# Patient Record
Sex: FEMALE | Race: WHITE | NOT HISPANIC OR LATINO
[De-identification: names, ages, dates, MRNs, and addresses within clinical notes are randomized per-mention and may not be internally consistent; named-entity substitution may affect disease eponyms.]

---

## 2018-01-09 ENCOUNTER — TRANSCRIPTION ENCOUNTER (OUTPATIENT)
Age: 28
End: 2018-01-09

## 2018-10-09 ENCOUNTER — TRANSCRIPTION ENCOUNTER (OUTPATIENT)
Age: 28
End: 2018-10-09

## 2019-03-15 ENCOUNTER — TRANSCRIPTION ENCOUNTER (OUTPATIENT)
Age: 29
End: 2019-03-15

## 2019-09-17 ENCOUNTER — APPOINTMENT (OUTPATIENT)
Dept: INTERNAL MEDICINE | Facility: CLINIC | Age: 29
End: 2019-09-17
Payer: COMMERCIAL

## 2019-09-17 VITALS
OXYGEN SATURATION: 97 % | RESPIRATION RATE: 16 BRPM | HEIGHT: 69 IN | DIASTOLIC BLOOD PRESSURE: 62 MMHG | TEMPERATURE: 98.2 F | BODY MASS INDEX: 27.55 KG/M2 | WEIGHT: 186 LBS | SYSTOLIC BLOOD PRESSURE: 110 MMHG | HEART RATE: 63 BPM

## 2019-09-17 DIAGNOSIS — Z83.3 FAMILY HISTORY OF DIABETES MELLITUS: ICD-10-CM

## 2019-09-17 PROCEDURE — 36415 COLL VENOUS BLD VENIPUNCTURE: CPT

## 2019-09-17 PROCEDURE — 99385 PREV VISIT NEW AGE 18-39: CPT | Mod: 25

## 2019-09-17 NOTE — HISTORY OF PRESENT ILLNESS
[FreeTextEntry1] : new pt cpx [de-identified] : 30 yo female here to establish care, pt reports hx of add in college- back in school now, was on adderall in the past

## 2019-09-17 NOTE — HEALTH RISK ASSESSMENT
[Excellent] : ~his/her~  mood as  excellent [] : No [Yes] : Yes [1 or 2 (0 pts)] : 1 or 2 (0 points) [No falls in past year] : Patient reported no falls in the past year [Never (0 pts)] : Never (0 points) [0] : 1) Little interest or pleasure doing things: Not at all (0) [Audit-CScore] : 0 [[id number]] : 0 [Patient reported PAP Smear was normal] : Patient reported PAP Smear was normal [Fully functional (bathing, dressing, toileting, transferring, walking, feeding)] : Fully functional (bathing, dressing, toileting, transferring, walking, feeding) [Fully functional (using the telephone, shopping, preparing meals, housekeeping, doing laundry, using] : Fully functional and needs no help or supervision to perform IADLs (using the telephone, shopping, preparing meals, housekeeping, doing laundry, using transportation, managing medications and managing finances) [Reports changes in hearing] : Reports no changes in hearing [Reports changes in vision] : Reports no changes in vision [PapSmearDate] : 01/19

## 2019-09-20 ENCOUNTER — TRANSCRIPTION ENCOUNTER (OUTPATIENT)
Age: 29
End: 2019-09-20

## 2019-09-23 LAB
ALBUMIN SERPL ELPH-MCNC: 4.6 G/DL
ALP BLD-CCNC: 42 U/L
ALT SERPL-CCNC: 21 U/L
ANION GAP SERPL CALC-SCNC: 13 MMOL/L
APPEARANCE: CLEAR
AST SERPL-CCNC: 21 U/L
BACTERIA: NEGATIVE
BASOPHILS # BLD AUTO: 0.03 K/UL
BASOPHILS NFR BLD AUTO: 0.5 %
BILIRUB SERPL-MCNC: 0.5 MG/DL
BILIRUBIN URINE: NEGATIVE
BLOOD URINE: NEGATIVE
BUN SERPL-MCNC: 19 MG/DL
CALCIUM SERPL-MCNC: 9.4 MG/DL
CHLORIDE SERPL-SCNC: 106 MMOL/L
CHOLEST SERPL-MCNC: 134 MG/DL
CHOLEST/HDLC SERPL: 2 RATIO
CO2 SERPL-SCNC: 21 MMOL/L
COLOR: NORMAL
CREAT SERPL-MCNC: 0.66 MG/DL
EOSINOPHIL # BLD AUTO: 0.14 K/UL
EOSINOPHIL NFR BLD AUTO: 2.2 %
ESTIMATED AVERAGE GLUCOSE: 88 MG/DL
FOLATE SERPL-MCNC: >20 NG/ML
GLUCOSE QUALITATIVE U: NEGATIVE
GLUCOSE SERPL-MCNC: 81 MG/DL
HBA1C MFR BLD HPLC: 4.7 %
HCT VFR BLD CALC: 42.7 %
HDLC SERPL-MCNC: 68 MG/DL
HGB BLD-MCNC: 13.6 G/DL
HYALINE CASTS: 1 /LPF
IMM GRANULOCYTES NFR BLD AUTO: 0.3 %
KETONES URINE: NEGATIVE
LDLC SERPL CALC-MCNC: 57 MG/DL
LEUKOCYTE ESTERASE URINE: NEGATIVE
LYMPHOCYTES # BLD AUTO: 1.5 K/UL
LYMPHOCYTES NFR BLD AUTO: 23.8 %
MAN DIFF?: NORMAL
MCHC RBC-ENTMCNC: 31.4 PG
MCHC RBC-ENTMCNC: 31.9 GM/DL
MCV RBC AUTO: 98.6 FL
MICROSCOPIC-UA: NORMAL
MONOCYTES # BLD AUTO: 0.65 K/UL
MONOCYTES NFR BLD AUTO: 10.3 %
NEUTROPHILS # BLD AUTO: 3.96 K/UL
NEUTROPHILS NFR BLD AUTO: 62.9 %
NITRITE URINE: NEGATIVE
PH URINE: 7.5
PLATELET # BLD AUTO: 251 K/UL
POTASSIUM SERPL-SCNC: 4.6 MMOL/L
PROT SERPL-MCNC: 6.2 G/DL
PROTEIN URINE: NEGATIVE
RBC # BLD: 4.33 M/UL
RBC # FLD: 12.7 %
RED BLOOD CELLS URINE: 3 /HPF
SODIUM SERPL-SCNC: 140 MMOL/L
SPECIFIC GRAVITY URINE: 1.02
SQUAMOUS EPITHELIAL CELLS: 2 /HPF
TRIGL SERPL-MCNC: 46 MG/DL
TSH SERPL-ACNC: 0.64 UIU/ML
UROBILINOGEN URINE: NORMAL
VIT B12 SERPL-MCNC: 664 PG/ML
WBC # FLD AUTO: 6.3 K/UL
WHITE BLOOD CELLS URINE: 0 /HPF

## 2019-11-11 ENCOUNTER — TRANSCRIPTION ENCOUNTER (OUTPATIENT)
Age: 29
End: 2019-11-11

## 2019-12-11 ENCOUNTER — TRANSCRIPTION ENCOUNTER (OUTPATIENT)
Age: 29
End: 2019-12-11

## 2019-12-21 ENCOUNTER — TRANSCRIPTION ENCOUNTER (OUTPATIENT)
Age: 29
End: 2019-12-21

## 2020-01-26 ENCOUNTER — TRANSCRIPTION ENCOUNTER (OUTPATIENT)
Age: 30
End: 2020-01-26

## 2020-02-04 ENCOUNTER — APPOINTMENT (OUTPATIENT)
Dept: OBGYN | Facility: CLINIC | Age: 30
End: 2020-02-04
Payer: COMMERCIAL

## 2020-02-04 VITALS — DIASTOLIC BLOOD PRESSURE: 79 MMHG | SYSTOLIC BLOOD PRESSURE: 120 MMHG | BODY MASS INDEX: 26.58 KG/M2 | WEIGHT: 180 LBS

## 2020-02-04 PROCEDURE — 99385 PREV VISIT NEW AGE 18-39: CPT

## 2020-02-04 NOTE — PHYSICAL EXAM
[Alert] : alert [Awake] : awake [Soft] : soft [Oriented x3] : oriented to person, place, and time [Normal] : uterus [No Bleeding] : there was no active vaginal bleeding [IUD String] : had an IUD string protruding out [Uterine Adnexae] : were not tender and not enlarged [Acute Distress] : no acute distress [Mass] : no breast mass [Nipple Discharge] : no nipple discharge [Axillary LAD] : no axillary lymphadenopathy [Tender] : non tender

## 2020-02-04 NOTE — HISTORY OF PRESENT ILLNESS
[1 Year Ago] : 1 year ago [Good] : being in good health [Healthy Diet] : a healthy diet [Regular Exercise] : regular exercise [Last Pap ___] : Last cervical pap smear was [unfilled] [Reproductive Age] : is of reproductive age [Contraception] : uses contraception [IUD] : has an intrauterine device [Sexually Active] : is sexually active [Weight Concerns] : no concerns with her weight [Menstrual Problems] : reports normal menses [Pregnancy History] : denies prior pregnancies

## 2020-02-06 LAB — HPV HIGH+LOW RISK DNA PNL CVX: NOT DETECTED

## 2020-02-10 LAB — CYTOLOGY CVX/VAG DOC THIN PREP: NORMAL

## 2020-04-26 ENCOUNTER — MESSAGE (OUTPATIENT)
Age: 30
End: 2020-04-26

## 2020-05-02 LAB
SARS-COV-2 IGG SERPL IA-ACNC: 0.4 INDEX
SARS-COV-2 IGG SERPL QL IA: NEGATIVE

## 2020-08-27 ENCOUNTER — APPOINTMENT (OUTPATIENT)
Dept: DERMATOLOGY | Facility: CLINIC | Age: 30
End: 2020-08-27
Payer: COMMERCIAL

## 2020-08-27 VITALS — WEIGHT: 190 LBS | BODY MASS INDEX: 28.14 KG/M2 | HEIGHT: 69 IN

## 2020-08-27 DIAGNOSIS — L70.0 ACNE VULGARIS: ICD-10-CM

## 2020-08-27 PROCEDURE — 99203 OFFICE O/P NEW LOW 30 MIN: CPT

## 2020-09-09 ENCOUNTER — NON-APPOINTMENT (OUTPATIENT)
Age: 30
End: 2020-09-09

## 2020-09-15 ENCOUNTER — APPOINTMENT (OUTPATIENT)
Dept: INTERNAL MEDICINE | Facility: CLINIC | Age: 30
End: 2020-09-15
Payer: COMMERCIAL

## 2020-09-15 ENCOUNTER — NON-APPOINTMENT (OUTPATIENT)
Age: 30
End: 2020-09-15

## 2020-09-15 VITALS
SYSTOLIC BLOOD PRESSURE: 110 MMHG | HEART RATE: 74 BPM | OXYGEN SATURATION: 97 % | HEIGHT: 69 IN | DIASTOLIC BLOOD PRESSURE: 72 MMHG | BODY MASS INDEX: 28.14 KG/M2 | TEMPERATURE: 98.1 F | WEIGHT: 190 LBS

## 2020-09-15 PROCEDURE — 93000 ELECTROCARDIOGRAM COMPLETE: CPT

## 2020-09-15 PROCEDURE — 99213 OFFICE O/P EST LOW 20 MIN: CPT | Mod: 25

## 2020-09-15 NOTE — HISTORY OF PRESENT ILLNESS
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

## 2020-09-17 ENCOUNTER — TRANSCRIPTION ENCOUNTER (OUTPATIENT)
Age: 30
End: 2020-09-17

## 2020-10-29 ENCOUNTER — LABORATORY RESULT (OUTPATIENT)
Age: 30
End: 2020-10-29

## 2020-12-20 ENCOUNTER — TRANSCRIPTION ENCOUNTER (OUTPATIENT)
Age: 30
End: 2020-12-20

## 2021-01-11 ENCOUNTER — OUTPATIENT (OUTPATIENT)
Dept: OUTPATIENT SERVICES | Facility: HOSPITAL | Age: 31
LOS: 1 days | End: 2021-01-11
Payer: COMMERCIAL

## 2021-01-11 DIAGNOSIS — Z20.828 CONTACT WITH AND (SUSPECTED) EXPOSURE TO OTHER VIRAL COMMUNICABLE DISEASES: ICD-10-CM

## 2021-01-11 PROCEDURE — U0003: CPT

## 2021-01-11 PROCEDURE — U0005: CPT

## 2021-01-11 PROCEDURE — C9803: CPT

## 2021-01-12 DIAGNOSIS — Z20.828 CONTACT WITH AND (SUSPECTED) EXPOSURE TO OTHER VIRAL COMMUNICABLE DISEASES: ICD-10-CM

## 2021-01-12 LAB — SARS-COV-2 RNA SPEC QL NAA+PROBE: SIGNIFICANT CHANGE UP

## 2021-02-07 ENCOUNTER — TRANSCRIPTION ENCOUNTER (OUTPATIENT)
Age: 31
End: 2021-02-07

## 2021-02-20 ENCOUNTER — APPOINTMENT (OUTPATIENT)
Dept: INTERNAL MEDICINE | Facility: CLINIC | Age: 31
End: 2021-02-20
Payer: COMMERCIAL

## 2021-02-20 ENCOUNTER — OUTPATIENT (OUTPATIENT)
Dept: OUTPATIENT SERVICES | Facility: HOSPITAL | Age: 31
LOS: 1 days | End: 2021-02-20
Payer: COMMERCIAL

## 2021-02-20 ENCOUNTER — LABORATORY RESULT (OUTPATIENT)
Age: 31
End: 2021-02-20

## 2021-02-20 ENCOUNTER — APPOINTMENT (OUTPATIENT)
Dept: ULTRASOUND IMAGING | Facility: CLINIC | Age: 31
End: 2021-02-20
Payer: COMMERCIAL

## 2021-02-20 VITALS
HEART RATE: 83 BPM | BODY MASS INDEX: 29.23 KG/M2 | SYSTOLIC BLOOD PRESSURE: 122 MMHG | HEIGHT: 69 IN | DIASTOLIC BLOOD PRESSURE: 78 MMHG | WEIGHT: 197.38 LBS | OXYGEN SATURATION: 98 % | TEMPERATURE: 98 F

## 2021-02-20 DIAGNOSIS — R13.0 APHAGIA: ICD-10-CM

## 2021-02-20 DIAGNOSIS — R14.0 ABDOMINAL DISTENSION (GASEOUS): ICD-10-CM

## 2021-02-20 PROCEDURE — 76700 US EXAM ABDOM COMPLETE: CPT | Mod: 26

## 2021-02-20 PROCEDURE — 36415 COLL VENOUS BLD VENIPUNCTURE: CPT

## 2021-02-20 PROCEDURE — 76700 US EXAM ABDOM COMPLETE: CPT

## 2021-02-20 PROCEDURE — 99213 OFFICE O/P EST LOW 20 MIN: CPT | Mod: 25

## 2021-02-20 PROCEDURE — 99072 ADDL SUPL MATRL&STAF TM PHE: CPT

## 2021-02-20 NOTE — PHYSICAL EXAM
[Non-distended] : non-distended [No Masses] : no abdominal mass palpated [No Hernias] : no hernias [Normal] : soft, non-tender, non-distended, no masses palpated, no HSM and normal bowel sounds [de-identified] : nonspecific mild upper abd pain

## 2021-02-20 NOTE — HISTORY OF PRESENT ILLNESS
[FreeTextEntry1] : GI issues [de-identified] : gets periodic regurgitation--worse past 2-3 months--can occur hours after eating--?odonophagia?\par no  anorexia, wt loss\par taking adderal with good benefit\par ?postprandial mild abd pain/bloating

## 2021-02-22 ENCOUNTER — TRANSCRIPTION ENCOUNTER (OUTPATIENT)
Age: 31
End: 2021-02-22

## 2021-02-22 LAB
ALBUMIN SERPL ELPH-MCNC: 4.6 G/DL
ALP BLD-CCNC: 61 U/L
ALT SERPL-CCNC: 20 U/L
ANION GAP SERPL CALC-SCNC: 12 MMOL/L
AST SERPL-CCNC: 22 U/L
BASOPHILS # BLD AUTO: 0.04 K/UL
BASOPHILS NFR BLD AUTO: 0.8 %
BILIRUB SERPL-MCNC: 0.4 MG/DL
BUN SERPL-MCNC: 10 MG/DL
CALCIUM SERPL-MCNC: 9.2 MG/DL
CHLORIDE SERPL-SCNC: 104 MMOL/L
CO2 SERPL-SCNC: 24 MMOL/L
CREAT SERPL-MCNC: 0.77 MG/DL
EOSINOPHIL # BLD AUTO: 0.14 K/UL
EOSINOPHIL NFR BLD AUTO: 2.9 %
GLUCOSE SERPL-MCNC: 78 MG/DL
HCT VFR BLD CALC: 41.6 %
HGB BLD-MCNC: 13.7 G/DL
IMM GRANULOCYTES NFR BLD AUTO: 0.2 %
LYMPHOCYTES # BLD AUTO: 1.71 K/UL
LYMPHOCYTES NFR BLD AUTO: 35.8 %
MAN DIFF?: NORMAL
MCHC RBC-ENTMCNC: 31.6 PG
MCHC RBC-ENTMCNC: 32.9 GM/DL
MCV RBC AUTO: 95.9 FL
MONOCYTES # BLD AUTO: 0.48 K/UL
MONOCYTES NFR BLD AUTO: 10 %
NEUTROPHILS # BLD AUTO: 2.4 K/UL
NEUTROPHILS NFR BLD AUTO: 50.3 %
PLATELET # BLD AUTO: 276 K/UL
POTASSIUM SERPL-SCNC: 4.6 MMOL/L
PROT SERPL-MCNC: 7 G/DL
RBC # BLD: 4.34 M/UL
RBC # FLD: 12.1 %
SODIUM SERPL-SCNC: 140 MMOL/L
WBC # FLD AUTO: 4.78 K/UL

## 2021-02-26 LAB
CELIAC DISEASE INTERPRETATION: NORMAL
CELIAC GENE PAIRS PRESENT: NO
DQ ALPHA 1: NORMAL
DQ BETA 1: NORMAL
IMMUNOGLOBULIN A (IGA): 173 MG/DL

## 2021-03-30 ENCOUNTER — APPOINTMENT (OUTPATIENT)
Dept: INTERNAL MEDICINE | Facility: CLINIC | Age: 31
End: 2021-03-30
Payer: COMMERCIAL

## 2021-03-30 ENCOUNTER — LABORATORY RESULT (OUTPATIENT)
Age: 31
End: 2021-03-30

## 2021-03-30 VITALS
HEART RATE: 77 BPM | BODY MASS INDEX: 28.58 KG/M2 | WEIGHT: 193 LBS | HEIGHT: 69 IN | DIASTOLIC BLOOD PRESSURE: 77 MMHG | SYSTOLIC BLOOD PRESSURE: 114 MMHG | OXYGEN SATURATION: 98 % | TEMPERATURE: 98.1 F

## 2021-03-30 DIAGNOSIS — Z83.49 FAMILY HISTORY OF OTHER ENDOCRINE, NUTRITIONAL AND METABOLIC DISEASES: ICD-10-CM

## 2021-03-30 PROCEDURE — 36415 COLL VENOUS BLD VENIPUNCTURE: CPT

## 2021-03-30 PROCEDURE — 99395 PREV VISIT EST AGE 18-39: CPT | Mod: 25

## 2021-03-30 PROCEDURE — 99072 ADDL SUPL MATRL&STAF TM PHE: CPT

## 2021-03-30 NOTE — HISTORY OF PRESENT ILLNESS
[FreeTextEntry1] : cpx [de-identified] : on ppi--still with some mild GERD without other GI symps\par using adderal during the week mostly--requests dose reduction

## 2021-04-02 LAB — TSI ACT/NOR SER: <0.1 IU/L

## 2021-04-03 LAB
CHOLEST SERPL-MCNC: 144 MG/DL
HDLC SERPL-MCNC: 67 MG/DL
LDLC SERPL CALC-MCNC: 64 MG/DL
NONHDLC SERPL-MCNC: 77 MG/DL
TRIGL SERPL-MCNC: 68 MG/DL

## 2021-04-15 ENCOUNTER — TRANSCRIPTION ENCOUNTER (OUTPATIENT)
Age: 31
End: 2021-04-15

## 2021-05-19 ENCOUNTER — APPOINTMENT (OUTPATIENT)
Dept: OBGYN | Facility: CLINIC | Age: 31
End: 2021-05-19
Payer: COMMERCIAL

## 2021-05-19 ENCOUNTER — NON-APPOINTMENT (OUTPATIENT)
Age: 31
End: 2021-05-19

## 2021-05-19 VITALS
SYSTOLIC BLOOD PRESSURE: 134 MMHG | DIASTOLIC BLOOD PRESSURE: 76 MMHG | HEIGHT: 69 IN | BODY MASS INDEX: 29.03 KG/M2 | WEIGHT: 196 LBS

## 2021-05-19 DIAGNOSIS — Z01.419 ENCOUNTER FOR GYNECOLOGICAL EXAMINATION (GENERAL) (ROUTINE) W/OUT ABNORMAL FINDINGS: ICD-10-CM

## 2021-05-19 DIAGNOSIS — Z30.431 ENCOUNTER FOR ROUTINE CHECKING OF INTRAUTERINE CONTRACEPTIVE DEVICE: ICD-10-CM

## 2021-05-19 DIAGNOSIS — N92.6 IRREGULAR MENSTRUATION, UNSPECIFIED: ICD-10-CM

## 2021-05-19 PROCEDURE — 99395 PREV VISIT EST AGE 18-39: CPT

## 2021-05-19 PROCEDURE — 99072 ADDL SUPL MATRL&STAF TM PHE: CPT

## 2021-05-19 PROCEDURE — 99213 OFFICE O/P EST LOW 20 MIN: CPT | Mod: 25

## 2021-05-19 NOTE — HISTORY OF PRESENT ILLNESS
[FreeTextEntry1] : 31 year-old patient with the IUD complaining of irregular staining which lasted 3-4 days this month,\par patient usually gets staining for a day . [Patient reported PAP Smear was normal] : Patient reported PAP Smear was normal [PapSmeardate] : 2020 [Abnormal Quantity] : abnormal quantity [Abnormal Duration] : abnormal duration [Light Bleeding] : light bleeding

## 2021-05-19 NOTE — DISCUSSION/SUMMARY
[FreeTextEntry1] : UCG negative,\par Patient thinks her irregular bleeding could be related to  vaccine,\par If bleeding continues recommend  transvaginal sonogram,\par all questions were answered.

## 2021-05-24 LAB — CYTOLOGY CVX/VAG DOC THIN PREP: NORMAL

## 2021-06-10 ENCOUNTER — TRANSCRIPTION ENCOUNTER (OUTPATIENT)
Age: 31
End: 2021-06-10

## 2021-06-10 ENCOUNTER — APPOINTMENT (OUTPATIENT)
Dept: CT IMAGING | Facility: CLINIC | Age: 31
End: 2021-06-10
Payer: COMMERCIAL

## 2021-06-10 ENCOUNTER — OUTPATIENT (OUTPATIENT)
Dept: OUTPATIENT SERVICES | Facility: HOSPITAL | Age: 31
LOS: 1 days | End: 2021-06-10
Payer: COMMERCIAL

## 2021-06-10 DIAGNOSIS — R10.9 UNSPECIFIED ABDOMINAL PAIN: ICD-10-CM

## 2021-06-10 PROCEDURE — 74176 CT ABD & PELVIS W/O CONTRAST: CPT | Mod: 26

## 2021-06-10 PROCEDURE — 74176 CT ABD & PELVIS W/O CONTRAST: CPT

## 2021-06-16 ENCOUNTER — TRANSCRIPTION ENCOUNTER (OUTPATIENT)
Age: 31
End: 2021-06-16

## 2021-08-24 ENCOUNTER — APPOINTMENT (OUTPATIENT)
Dept: OBGYN | Facility: CLINIC | Age: 31
End: 2021-08-24
Payer: COMMERCIAL

## 2021-08-24 VITALS — DIASTOLIC BLOOD PRESSURE: 81 MMHG | SYSTOLIC BLOOD PRESSURE: 136 MMHG | HEIGHT: 69 IN

## 2021-08-24 DIAGNOSIS — N90.89 OTHER SPECIFIED NONINFLAMMATORY DISORDERS OF VULVA AND PERINEUM: ICD-10-CM

## 2021-08-24 PROCEDURE — 99212 OFFICE O/P EST SF 10 MIN: CPT

## 2021-08-24 NOTE — PHYSICAL EXAM
[Appropriately responsive] : appropriately responsive [Alert] : alert [No Acute Distress] : no acute distress [Soft] : soft [Non-tender] : non-tender [Non-distended] : non-distended [Labia Majora] : normal [Labia Minora] : normal [Normal] : normal [IUD String] : an IUD string was noted

## 2021-08-24 NOTE — REVIEW OF SYSTEMS
[Fever] : no fever [Chills] : no chills [Fatigue] : no fatigue [Urgency] : no urgency [Frequency] : no frequency [Dysuria] : no dysuria

## 2021-08-24 NOTE — PLAN
[FreeTextEntry1] : -vulvar itching-  exam wnl, Affirm sent will follow up results\par - episode of cramping- UPT negative, patient counseled to journal the pain and bleeding, consider TVUS to check IUD placement if continues\par Patient to follow up in one year for annual exam

## 2021-08-24 NOTE — HISTORY OF PRESENT ILLNESS
[FreeTextEntry1] : 32yo P0 with Mirena IUD presents for vulvar itching\par patient states that the itching occurs at random. She also is concerned  about an episode of sharp cramping that was followed by small blood clots that occurred 1-2 weeks ago. SHe is otherwise amenorrheic secondary to IUD. patient also requests a UPT. \par Denies any recent new sexual encounter, abnormal vaginal discharge and dysuria

## 2021-08-26 LAB
CANDIDA VAG CYTO: NOT DETECTED
G VAGINALIS+PREV SP MTYP VAG QL MICRO: NOT DETECTED
T VAGINALIS VAG QL WET PREP: NOT DETECTED

## 2021-09-22 ENCOUNTER — APPOINTMENT (OUTPATIENT)
Dept: INTERNAL MEDICINE | Facility: CLINIC | Age: 31
End: 2021-09-22
Payer: COMMERCIAL

## 2021-09-22 VITALS
DIASTOLIC BLOOD PRESSURE: 79 MMHG | HEART RATE: 88 BPM | WEIGHT: 194 LBS | HEIGHT: 69 IN | OXYGEN SATURATION: 100 % | BODY MASS INDEX: 28.73 KG/M2 | TEMPERATURE: 98 F | SYSTOLIC BLOOD PRESSURE: 119 MMHG

## 2021-09-22 DIAGNOSIS — Z20.822 CONTACT WITH AND (SUSPECTED) EXPOSURE TO COVID-19: ICD-10-CM

## 2021-09-22 PROCEDURE — 36415 COLL VENOUS BLD VENIPUNCTURE: CPT

## 2021-09-22 PROCEDURE — 99213 OFFICE O/P EST LOW 20 MIN: CPT | Mod: 25

## 2021-09-22 NOTE — HISTORY OF PRESENT ILLNESS
[FreeTextEntry1] : f/u ADD [de-identified] : uses adderal at 10mg with good effect while working\par occ ppi use\par pt with hx of poor antibody-response to immunizations--covid vaccine 1/21-req covid ab level

## 2021-09-23 LAB
COVID-19 SPIKE DOMAIN ANTIBODY INTERPRETATION: POSITIVE
SARS-COV-2 AB SERPL IA-ACNC: >250 U/ML
TSH SERPL-ACNC: 0.94 UIU/ML

## 2021-12-29 ENCOUNTER — APPOINTMENT (OUTPATIENT)
Dept: OBGYN | Facility: CLINIC | Age: 31
End: 2021-12-29
Payer: COMMERCIAL

## 2021-12-29 DIAGNOSIS — Z53.8 PROCEDURE AND TREATMENT NOT CARRIED OUT FOR OTHER REASONS: ICD-10-CM

## 2021-12-29 DIAGNOSIS — Z30.431 ENCOUNTER FOR ROUTINE CHECKING OF INTRAUTERINE CONTRACEPTIVE DEVICE: ICD-10-CM

## 2021-12-29 DIAGNOSIS — Z97.5 PROCEDURE AND TREATMENT NOT CARRIED OUT FOR OTHER REASONS: ICD-10-CM

## 2021-12-29 PROCEDURE — 99213 OFFICE O/P EST LOW 20 MIN: CPT | Mod: 25

## 2021-12-29 PROCEDURE — 58301 REMOVE INTRAUTERINE DEVICE: CPT | Mod: 53

## 2021-12-29 NOTE — PLAN
[FreeTextEntry1] : Patient was counseled and will attempt to remove the IUD under sonogram guidance . patient will take misoprostol night before the procedure .

## 2021-12-29 NOTE — PROCEDURE
[Speculum Placed] : speculum placed [FreeTextEntry6] : Patient complains of discomfort and pain hence the procedure was terminated will do the procedure under sonogram guidance

## 2021-12-29 NOTE — HISTORY OF PRESENT ILLNESS
[FreeTextEntry1] : 31 year-old patient wanting her IUD removed.  Patient has an IUD for 3 years.\par Patient will use condom for contraception.

## 2021-12-30 ENCOUNTER — TRANSCRIPTION ENCOUNTER (OUTPATIENT)
Age: 31
End: 2021-12-30

## 2022-07-09 ENCOUNTER — NON-APPOINTMENT (OUTPATIENT)
Age: 32
End: 2022-07-09

## 2022-07-10 ENCOUNTER — NON-APPOINTMENT (OUTPATIENT)
Age: 32
End: 2022-07-10

## 2022-07-21 ENCOUNTER — APPOINTMENT (OUTPATIENT)
Dept: INTERNAL MEDICINE | Facility: CLINIC | Age: 32
End: 2022-07-21

## 2022-08-02 ENCOUNTER — APPOINTMENT (OUTPATIENT)
Dept: ORTHOPEDIC SURGERY | Facility: CLINIC | Age: 32
End: 2022-08-02

## 2022-08-02 VITALS — BODY MASS INDEX: 28.73 KG/M2 | WEIGHT: 194 LBS | HEIGHT: 69 IN

## 2022-08-02 DIAGNOSIS — M77.51 OTHER ENTHESOPATHY OF RT FOOT AND ANKLE: ICD-10-CM

## 2022-08-02 PROCEDURE — 73630 X-RAY EXAM OF FOOT: CPT | Mod: RT

## 2022-08-02 PROCEDURE — L4350: CPT

## 2022-08-02 PROCEDURE — 99203 OFFICE O/P NEW LOW 30 MIN: CPT

## 2022-08-02 PROCEDURE — 73600 X-RAY EXAM OF ANKLE: CPT | Mod: RT

## 2022-08-02 NOTE — PHYSICAL EXAM
[Right] : right foot and ankle [NL (40)] : plantar flexion 40 degrees [NL 30)] : inversion 30 degrees [NL (20)] : eversion 20 degrees [5___] : eversion 5[unfilled]/5 [2+] : dorsalis pedis pulse: 2+ [] : patient ambulates without assistive device

## 2022-08-02 NOTE — HISTORY OF PRESENT ILLNESS
[4] : 4 [0] : 0 [Dull/Aching] : dull/aching [Sharp] : sharp [Constant] : constant [de-identified] : 08/02/2022: Pt states she hit her foot on a table a month ago and has been having pain ever since. No tx to date. no prior foot probs. 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

## 2022-08-02 NOTE — ASSESSMENT
[FreeTextEntry1] : wbat\par airsport\par ice/elevate\par nsaids prn\par f/up prn\par activity as jeanie

## 2022-09-26 ENCOUNTER — APPOINTMENT (OUTPATIENT)
Dept: DERMATOLOGY | Facility: CLINIC | Age: 32
End: 2022-09-26

## 2022-09-26 DIAGNOSIS — L82.1 OTHER SEBORRHEIC KERATOSIS: ICD-10-CM

## 2022-09-26 DIAGNOSIS — D22.9 MELANOCYTIC NEVI, UNSPECIFIED: ICD-10-CM

## 2022-09-26 DIAGNOSIS — L81.4 OTHER MELANIN HYPERPIGMENTATION: ICD-10-CM

## 2022-09-26 PROCEDURE — 99213 OFFICE O/P EST LOW 20 MIN: CPT

## 2022-09-27 ENCOUNTER — APPOINTMENT (OUTPATIENT)
Dept: DERMATOLOGY | Facility: CLINIC | Age: 32
End: 2022-09-27

## 2022-10-06 ENCOUNTER — RESULT REVIEW (OUTPATIENT)
Age: 32
End: 2022-10-06

## 2022-10-26 ENCOUNTER — APPOINTMENT (OUTPATIENT)
Dept: INTERNAL MEDICINE | Facility: CLINIC | Age: 32
End: 2022-10-26

## 2022-10-26 VITALS
HEART RATE: 63 BPM | BODY MASS INDEX: 29.19 KG/M2 | TEMPERATURE: 98.3 F | SYSTOLIC BLOOD PRESSURE: 122 MMHG | OXYGEN SATURATION: 98 % | HEIGHT: 69 IN | WEIGHT: 197.06 LBS | DIASTOLIC BLOOD PRESSURE: 78 MMHG

## 2022-10-26 DIAGNOSIS — Z00.00 ENCOUNTER FOR GENERAL ADULT MEDICAL EXAMINATION W/OUT ABNORMAL FINDINGS: ICD-10-CM

## 2022-10-26 PROCEDURE — 36415 COLL VENOUS BLD VENIPUNCTURE: CPT

## 2022-10-26 PROCEDURE — 99395 PREV VISIT EST AGE 18-39: CPT | Mod: 25

## 2022-10-27 LAB
ALBUMIN SERPL ELPH-MCNC: 4.7 G/DL
ALP BLD-CCNC: 50 U/L
ALT SERPL-CCNC: 14 U/L
ANION GAP SERPL CALC-SCNC: 10 MMOL/L
APPEARANCE: CLEAR
AST SERPL-CCNC: 23 U/L
BACTERIA: ABNORMAL
BASOPHILS # BLD AUTO: 0.03 K/UL
BASOPHILS NFR BLD AUTO: 0.6 %
BILIRUB SERPL-MCNC: 0.5 MG/DL
BILIRUBIN URINE: NEGATIVE
BLOOD URINE: NEGATIVE
BUN SERPL-MCNC: 16 MG/DL
CALCIUM SERPL-MCNC: 9.5 MG/DL
CHLORIDE SERPL-SCNC: 106 MMOL/L
CHOLEST SERPL-MCNC: 149 MG/DL
CO2 SERPL-SCNC: 25 MMOL/L
COLOR: NORMAL
CREAT SERPL-MCNC: 0.83 MG/DL
EGFR: 97 ML/MIN/1.73M2
EOSINOPHIL # BLD AUTO: 0.1 K/UL
EOSINOPHIL NFR BLD AUTO: 2 %
GLUCOSE QUALITATIVE U: NEGATIVE
GLUCOSE SERPL-MCNC: 82 MG/DL
HCT VFR BLD CALC: 40 %
HDLC SERPL-MCNC: 72 MG/DL
HGB BLD-MCNC: 13.3 G/DL
HYALINE CASTS: 3 /LPF
IMM GRANULOCYTES NFR BLD AUTO: 0.2 %
KETONES URINE: NEGATIVE
LDLC SERPL CALC-MCNC: 69 MG/DL
LEUKOCYTE ESTERASE URINE: NEGATIVE
LYMPHOCYTES # BLD AUTO: 1.59 K/UL
LYMPHOCYTES NFR BLD AUTO: 32 %
MAN DIFF?: NORMAL
MCHC RBC-ENTMCNC: 30 PG
MCHC RBC-ENTMCNC: 33.3 GM/DL
MCV RBC AUTO: 90.3 FL
MICROSCOPIC-UA: NORMAL
MONOCYTES # BLD AUTO: 0.49 K/UL
MONOCYTES NFR BLD AUTO: 9.9 %
NEUTROPHILS # BLD AUTO: 2.75 K/UL
NEUTROPHILS NFR BLD AUTO: 55.3 %
NITRITE URINE: NEGATIVE
NONHDLC SERPL-MCNC: 77 MG/DL
PH URINE: 6.5
PLATELET # BLD AUTO: 277 K/UL
POTASSIUM SERPL-SCNC: 4.5 MMOL/L
PROT SERPL-MCNC: 7.5 G/DL
PROTEIN URINE: NEGATIVE
RBC # BLD: 4.43 M/UL
RBC # FLD: 13.9 %
RED BLOOD CELLS URINE: 4 /HPF
SODIUM SERPL-SCNC: 140 MMOL/L
SPECIFIC GRAVITY URINE: 1.01
SQUAMOUS EPITHELIAL CELLS: 4 /HPF
TRIGL SERPL-MCNC: 41 MG/DL
TSH SERPL-ACNC: 1.62 UIU/ML
UROBILINOGEN URINE: NORMAL
WBC # FLD AUTO: 4.97 K/UL
WHITE BLOOD CELLS URINE: 2 /HPF

## 2022-10-27 NOTE — HISTORY OF PRESENT ILLNESS
[FreeTextEntry1] : cpx [de-identified] : some GERD and feelings of reguritation not relieved by ppi--never saw GI\par covid vacced\par gym 6x/wk

## 2022-11-10 ENCOUNTER — APPOINTMENT (OUTPATIENT)
Dept: DERMATOLOGY | Facility: CLINIC | Age: 32
End: 2022-11-10

## 2022-12-21 ENCOUNTER — APPOINTMENT (OUTPATIENT)
Dept: GASTROENTEROLOGY | Facility: CLINIC | Age: 32
End: 2022-12-21

## 2022-12-21 VITALS
HEART RATE: 87 BPM | SYSTOLIC BLOOD PRESSURE: 140 MMHG | BODY MASS INDEX: 29.33 KG/M2 | OXYGEN SATURATION: 100 % | WEIGHT: 198 LBS | DIASTOLIC BLOOD PRESSURE: 81 MMHG | HEIGHT: 69 IN

## 2022-12-21 DIAGNOSIS — K21.9 GASTRO-ESOPHAGEAL REFLUX DISEASE W/OUT ESOPHAGITIS: ICD-10-CM

## 2022-12-21 PROCEDURE — 99204 OFFICE O/P NEW MOD 45 MIN: CPT

## 2022-12-21 RX ORDER — ESOMEPRAZOLE MAGNESIUM 40 MG/1
40 CAPSULE, DELAYED RELEASE ORAL
Qty: 90 | Refills: 0 | Status: DISCONTINUED | COMMUNITY
Start: 2021-03-30 | End: 2022-12-21

## 2022-12-21 NOTE — HISTORY OF PRESENT ILLNESS
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\par [facility]\par \par Patient has had chronic reflux\par \par Mostly to fast food and fatty foods\par \par Sometimes to milk products\par \par Particularly ice cream\par \par But even avoiding these foods she has free reflux of gastric contents into the back of her throat occasionally\par \par Recently the onset of heartburn\par \par In the past she has tried Nexium, she does not really recall it being of great benefit\par \par She has been using Mylanta fairly frequently with good results and that also helps her constipation\par \par This no dysphagia, odynophagia, anorexia\par \par No weight loss\par \par No family history of celiac disease, ulcers, intestinal neoplasms\par \par She had some mild constipation that seems to have alleviated with the Mylanta use\par \par No blood per rectum

## 2022-12-21 NOTE — ASSESSMENT
[FreeTextEntry1] : Impression\par \par Reflux\par \par Heartburn\par \par Mild constipation better with Mylanta\par \par Suggest\par \par Antireflux diet reinforced\par \par Trial of Pepcid 40 mg a day\par \par Gaviscon 2 tablets up to 3-4 times as needed\par \par She can continue to use Mylanta as needed\par \par Upper endoscopy\par \par Risks/benefits:\par The procedure, the risks and benefits and alternatives have been reviewed in great detail with the patient.  Risks including, but not limited to sedation such as cardiac and pulmonary compromise, the procedure itself such as bleeding requiring hospitalization, transfusion, surgery, temporary or permanent colostomy.  Perforation or puncture of the requiring hospitalization, surgery, temporary colostomy.\par \par The patient expresses understanding of the procedure and consents to undergoing the procedure.\par \par

## 2022-12-21 NOTE — PHYSICAL EXAM
[Alert] : alert [Normal Voice/Communication] : normal voice/communication [Healthy Appearing] : healthy appearing [No Acute Distress] : no acute distress [Sclera] : the sclera and conjunctiva were normal [Hearing Threshold Finger Rub Not [place]] : hearing was normal [Normal Appearance] : the appearance of the neck was normal [No Respiratory Distress] : no respiratory distress [No Acc Muscle Use] : no accessory muscle use [Respiration, Rhythm And Depth] : normal respiratory rhythm and effort [Heart Rate And Rhythm] : heart rate was normal and rhythm regular [None] : no edema [Bowel Sounds] : normal bowel sounds [Abdomen Tenderness] : non-tender [No Masses] : no abdominal mass palpated [Abdomen Soft] : soft [] : no hepatosplenomegaly [Cervical Lymph Nodes Enlarged Posterior Bilaterally] : no posterior cervical lymphadenopathy [No CVA Tenderness] : no CVA  tenderness [No Spinal Tenderness] : no spinal tenderness [Normal Color / Pigmentation] : normal skin color and pigmentation [No Focal Deficits] : no focal deficits [Oriented To Time, Place, And Person] : oriented to person, place, and time [de-identified] : A chaperone was present in the examining room during all aspects of the physical examination, pleasant female, no acute distress

## 2022-12-23 DIAGNOSIS — Z01.812 ENCOUNTER FOR PREPROCEDURAL LABORATORY EXAMINATION: ICD-10-CM

## 2022-12-23 DIAGNOSIS — Z20.822 ENCOUNTER FOR PREPROCEDURAL LABORATORY EXAMINATION: ICD-10-CM

## 2023-03-07 ENCOUNTER — TRANSCRIPTION ENCOUNTER (OUTPATIENT)
Age: 33
End: 2023-03-07

## 2023-03-08 ENCOUNTER — APPOINTMENT (OUTPATIENT)
Dept: INTERNAL MEDICINE | Facility: CLINIC | Age: 33
End: 2023-03-08
Payer: COMMERCIAL

## 2023-03-08 VITALS — DIASTOLIC BLOOD PRESSURE: 76 MMHG | SYSTOLIC BLOOD PRESSURE: 128 MMHG

## 2023-03-08 VITALS
OXYGEN SATURATION: 97 % | WEIGHT: 199 LBS | TEMPERATURE: 98 F | BODY MASS INDEX: 29.47 KG/M2 | HEIGHT: 69 IN | HEART RATE: 76 BPM

## 2023-03-08 DIAGNOSIS — R76.12 NONSPECIFIC REACTION TO CELL MEDIATED IMMUNITY MEASUREMENT OF GAMMA INTERFERON ANTIGEN RESPONSE W/OUT ACTIVE TUBERCULOSIS: ICD-10-CM

## 2023-03-08 PROCEDURE — 36415 COLL VENOUS BLD VENIPUNCTURE: CPT

## 2023-03-08 PROCEDURE — 99213 OFFICE O/P EST LOW 20 MIN: CPT | Mod: 25

## 2023-03-08 NOTE — ASSESSMENT
[FreeTextEntry1] : check labs\par possible TB prophylaxis--pt getting  in July and may want to defer tx.

## 2023-03-08 NOTE — HISTORY OF PRESENT ILLNESS
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

## 2023-03-09 LAB
ALBUMIN SERPL ELPH-MCNC: 4.6 G/DL
ALP BLD-CCNC: 60 U/L
ALT SERPL-CCNC: 21 U/L
AST SERPL-CCNC: 25 U/L
BILIRUB DIRECT SERPL-MCNC: 0.1 MG/DL
BILIRUB INDIRECT SERPL-MCNC: 0.1 MG/DL
BILIRUB SERPL-MCNC: 0.2 MG/DL
PROT SERPL-MCNC: 7.2 G/DL

## 2023-03-14 ENCOUNTER — TRANSCRIPTION ENCOUNTER (OUTPATIENT)
Age: 33
End: 2023-03-14

## 2023-03-14 LAB
M TB IFN-G BLD-IMP: NEGATIVE
QUANTIFERON TB PLUS MITOGEN MINUS NIL: >10 IU/ML
QUANTIFERON TB PLUS NIL: 0.04 IU/ML
QUANTIFERON TB PLUS TB1 MINUS NIL: 0.26 IU/ML
QUANTIFERON TB PLUS TB2 MINUS NIL: 0.26 IU/ML

## 2023-04-19 ENCOUNTER — APPOINTMENT (OUTPATIENT)
Dept: ORTHOPEDIC SURGERY | Facility: CLINIC | Age: 33
End: 2023-04-19
Payer: COMMERCIAL

## 2023-04-19 VITALS — WEIGHT: 180 LBS | HEIGHT: 69 IN | BODY MASS INDEX: 26.66 KG/M2

## 2023-04-19 DIAGNOSIS — M76.31 ILIOTIBIAL BAND SYNDROME, RIGHT LEG: ICD-10-CM

## 2023-04-19 DIAGNOSIS — M23.91 UNSPECIFIED INTERNAL DERANGEMENT OF RIGHT KNEE: ICD-10-CM

## 2023-04-19 DIAGNOSIS — Z78.9 OTHER SPECIFIED HEALTH STATUS: ICD-10-CM

## 2023-04-19 PROCEDURE — 73564 X-RAY EXAM KNEE 4 OR MORE: CPT | Mod: RT

## 2023-04-19 PROCEDURE — 99213 OFFICE O/P EST LOW 20 MIN: CPT

## 2023-04-19 NOTE — PHYSICAL EXAM
[Right] : right knee [NL (140)] : flexion 140 degrees [NL (0)] : extension 0 degrees [5___] : quadriceps 5[unfilled]/5 [Negative] : negative Suha's [] : patient ambulates without assistive device

## 2023-04-19 NOTE — DISCUSSION/SUMMARY
[de-identified] : 33f with right knee ITB syndrome\par 1) start physical therapy\par 2) cryotherapy, rest and activity modification\par 3) if no improvement will consider eval with MRI \par 4) rtc prn \par \par Entered by Daja De La Torre acting as scribe.\par Dr. Garcia-\par The documentation recorded by the scribe accurately reflects the service I personally performed and the decisions made by me.

## 2023-04-19 NOTE — HISTORY OF PRESENT ILLNESS
[de-identified] : 04/19/2023 : SUSAN ELLINGTON is a 33 year female presenting today for right knee pain. Patient reports that she noticed pain on 4/13/23, feels pain may have been related to learning ballroom dancing. Sharp intermittent pain. Worse with twisting, pivoting, prolonged walking/standing, repetitive sit to stand. Describes pain as being located mostly over the lateral aspect. Better with rest, NSAIDs. No Prior treatment. Does crossfit/orange theory. Occupation: CT tech\par

## 2023-04-27 ENCOUNTER — APPOINTMENT (OUTPATIENT)
Dept: GASTROENTEROLOGY | Facility: AMBULATORY MEDICAL SERVICES | Age: 33
End: 2023-04-27
Payer: COMMERCIAL

## 2023-04-27 PROCEDURE — 43239 EGD BIOPSY SINGLE/MULTIPLE: CPT

## 2023-05-12 ENCOUNTER — NON-APPOINTMENT (OUTPATIENT)
Age: 33
End: 2023-05-12

## 2023-06-11 ENCOUNTER — NON-APPOINTMENT (OUTPATIENT)
Age: 33
End: 2023-06-11

## 2023-07-19 ENCOUNTER — NON-APPOINTMENT (OUTPATIENT)
Age: 33
End: 2023-07-19

## 2023-08-07 ENCOUNTER — NON-APPOINTMENT (OUTPATIENT)
Age: 33
End: 2023-08-07

## 2023-08-16 ENCOUNTER — APPOINTMENT (OUTPATIENT)
Dept: INTERNAL MEDICINE | Facility: CLINIC | Age: 33
End: 2023-08-16
Payer: COMMERCIAL

## 2023-08-16 VITALS
BODY MASS INDEX: 30.07 KG/M2 | SYSTOLIC BLOOD PRESSURE: 127 MMHG | HEART RATE: 66 BPM | HEIGHT: 69 IN | WEIGHT: 203 LBS | OXYGEN SATURATION: 98 % | TEMPERATURE: 97.7 F | RESPIRATION RATE: 16 BRPM | DIASTOLIC BLOOD PRESSURE: 78 MMHG

## 2023-08-16 DIAGNOSIS — J32.0 CHRONIC MAXILLARY SINUSITIS: ICD-10-CM

## 2023-08-16 PROCEDURE — 99214 OFFICE O/P EST MOD 30 MIN: CPT

## 2023-08-16 RX ORDER — FAMOTIDINE 40 MG/1
40 TABLET, FILM COATED ORAL DAILY
Qty: 30 | Refills: 2 | Status: COMPLETED | COMMUNITY
Start: 2022-12-21 | End: 2023-08-16

## 2023-08-16 RX ORDER — AMOXICILLIN AND CLAVULANATE POTASSIUM 875; 125 MG/1; MG/1
875-125 TABLET, COATED ORAL
Qty: 14 | Refills: 0 | Status: ACTIVE | COMMUNITY
Start: 2023-08-16 | End: 1900-01-01

## 2023-08-16 RX ORDER — DEXTROAMPHETAMINE SACCHARATE, AMPHETAMINE ASPARTATE, DEXTROAMPHETAMINE SULFATE AND AMPHETAMINE SULFATE 2.5; 2.5; 2.5; 2.5 MG/1; MG/1; MG/1; MG/1
10 TABLET ORAL
Qty: 30 | Refills: 0 | Status: ACTIVE | COMMUNITY
Start: 2020-09-15 | End: 1900-01-01

## 2023-08-16 RX ORDER — CLINDAMYCIN PHOSPHATE 10 MG/ML
1 LOTION TOPICAL
Qty: 1 | Refills: 6 | Status: COMPLETED | COMMUNITY
Start: 2020-09-17 | End: 2023-08-16

## 2023-08-16 RX ORDER — ALUMINUM HYDROXIDE AND MAGNESIUM CARBONATE 160; 105 MG/1; MG/1
160-105 TABLET, CHEWABLE ORAL
Qty: 30 | Refills: 5 | Status: COMPLETED | COMMUNITY
Start: 2022-12-21 | End: 2023-08-16

## 2023-08-16 RX ORDER — MISOPROSTOL 200 UG/1
200 TABLET ORAL
Qty: 2 | Refills: 0 | Status: COMPLETED | COMMUNITY
Start: 2021-12-29 | End: 2023-08-16

## 2023-08-16 RX ORDER — BENZOYL PEROXIDE 5 G/100G
5 LIQUID TOPICAL
Qty: 1 | Refills: 5 | Status: COMPLETED | COMMUNITY
Start: 2020-09-17 | End: 2023-08-16

## 2023-08-16 NOTE — PHYSICAL EXAM
[Normal Oropharynx] : the oropharynx was normal [No Lymphadenopathy] : no lymphadenopathy [No Respiratory Distress] : no respiratory distress  [No Accessory Muscle Use] : no accessory muscle use [Clear to Auscultation] : lungs were clear to auscultation bilaterally [No Edema] : there was no peripheral edema [Normal Posterior Cervical Nodes] : no posterior cervical lymphadenopathy [Normal Anterior Cervical Nodes] : no anterior cervical lymphadenopathy [Normal] : affect was normal and insight and judgment were intact [de-identified] : no erythema, no tonsillar enlargement bilaterally, no exudate bilaterally, uvula midline. +PND, slight erythema in b/l ears. TM clear no erythema.

## 2023-08-16 NOTE — REVIEW OF SYSTEMS
[Negative] : Heme/Lymph [Earache] : no earache [Hearing Loss] : no hearing loss [Nosebleed] : no nosebleeds [Hoarseness] : no hoarseness [Nasal Discharge] : no nasal discharge [Sore Throat] : no sore throat [Postnasal Drip] : postnasal drip [FreeTextEntry4] : sinus/nasal congestion

## 2023-08-16 NOTE — HEALTH RISK ASSESSMENT
[No] : No [No falls in past year] : Patient reported no falls in the past year [0] : 2) Feeling down, depressed, or hopeless: Not at all (0) [PHQ-2 Negative - No further assessment needed] : PHQ-2 Negative - No further assessment needed [Never] : Never [[id number]] : 0

## 2023-08-16 NOTE — ASSESSMENT
[FreeTextEntry1] : #Maxillary Sinusitis - Augmentin bid for 7 days with food - Flonase 1 puff each nostril qd  - take medication with food - Tylenol/ibuprofen for fever body aches - rest drink plenty of fluids - no improvement 3-4 days return to office/tele visit/urgent care #ADD istop - renewal needed.  tolerating adderall well Pt denies any ha, dizziness, tinnitus, lightheadedness, epistaxis, vision changes, chest pain, palpitations,  sob, syncopal episodes, WILLIAMSON, or leg swelling, n/v abdominal pain.  pt agrees and understands plan via teach back method. all questions answered. A total of 35 minutes including same day pre-visit chart review, face to face time with patient, coordination of care, and documentation was spent on this visit.

## 2023-08-16 NOTE — HISTORY OF PRESENT ILLNESS
[FreeTextEntry8] : 33 F LMP: 1 week ago - not on ocp - not trying to get pregnant.  presents with a few days of URI symptoms including: Rhinitis, mild cough, sinus congestion, throat irritation Denies fever, body aches + earache Denies SOB or wheezing or CP or palpitations Denies NVD, abdominal pain Denies HA + covid vaccinated covid test negative denies any recent travel denies any recent sick contacts denies any loss of taste or smell.

## 2023-08-29 ENCOUNTER — APPOINTMENT (OUTPATIENT)
Dept: ORTHOPEDIC SURGERY | Facility: CLINIC | Age: 33
End: 2023-08-29
Payer: COMMERCIAL

## 2023-08-29 VITALS — BODY MASS INDEX: 28.49 KG/M2 | HEIGHT: 70 IN | WEIGHT: 199 LBS

## 2023-08-29 DIAGNOSIS — M62.830 MUSCLE SPASM OF BACK: ICD-10-CM

## 2023-08-29 PROCEDURE — 99214 OFFICE O/P EST MOD 30 MIN: CPT

## 2023-08-29 PROCEDURE — 72100 X-RAY EXAM L-S SPINE 2/3 VWS: CPT

## 2023-08-29 PROCEDURE — 72070 X-RAY EXAM THORAC SPINE 2VWS: CPT

## 2023-08-29 RX ORDER — DICLOFENAC SODIUM 75 MG/1
75 TABLET, DELAYED RELEASE ORAL TWICE DAILY
Qty: 60 | Refills: 0 | Status: ACTIVE | COMMUNITY
Start: 2023-08-29 | End: 1900-01-01

## 2023-08-29 NOTE — HISTORY OF PRESENT ILLNESS
[de-identified] : 8/29/23 The patient is a 33-year-old female who presents today complaining of thoracic/lumbar spine dull ache/pain and spasm that has been present for 2 weeks. Patient reports having episode of BLE radicular symptoms. Patient has been taking Ibuprofen and diclofenac with some improvement. Patient has modified activity and sleep with some relief. Patient denies n/t, denies change in b/b function.  Date of Injury/Onset:  2 weeks Pain:    At Rest:   4-5/10 With Activity:   1-2/10 Mechanism of injury:  onset Quality of symptoms:   dull ache Improves with:  changing positions, stretching Worse with:  laying on back and stomach Prior treatment:  no Prior Imaging:  no Additional Information: None  No PmHx Occupation: CT tech at [employer] \

## 2023-08-29 NOTE — ASSESSMENT
[FreeTextEntry1] : 33 F with mid to low back pain.  X-rays normal likely musculoskeletal PT  We will also provide a prescription for anti-inflammatories.  Discussed major side effects of medication including but not limited to gastritis and acute kidney injury.  She was instructed to take with food and to discontinue use if stomach or esophageal pain developed.  Normal vision: sees adequately in most situations; can see medication labels, newsprint

## 2023-08-29 NOTE — IMAGING
[No bony abnormalities] : No bony abnormalities [No spinal deformity, fracture, lytic lesion, or marked single level collapse] : No spinal deformity, fracture, lytic lesion, or marked single level collapse

## 2023-09-01 ENCOUNTER — TRANSCRIPTION ENCOUNTER (OUTPATIENT)
Age: 33
End: 2023-09-01

## 2023-09-04 ENCOUNTER — EMERGENCY (EMERGENCY)
Facility: HOSPITAL | Age: 33
LOS: 0 days | Discharge: HOME | End: 2023-09-04
Attending: STUDENT IN AN ORGANIZED HEALTH CARE EDUCATION/TRAINING PROGRAM
Payer: COMMERCIAL

## 2023-09-04 VITALS
HEART RATE: 61 BPM | TEMPERATURE: 99 F | OXYGEN SATURATION: 100 % | SYSTOLIC BLOOD PRESSURE: 128 MMHG | DIASTOLIC BLOOD PRESSURE: 83 MMHG | RESPIRATION RATE: 18 BRPM

## 2023-09-04 VITALS
RESPIRATION RATE: 19 BRPM | HEART RATE: 70 BPM | TEMPERATURE: 98 F | OXYGEN SATURATION: 100 % | HEIGHT: 70 IN | DIASTOLIC BLOOD PRESSURE: 95 MMHG | WEIGHT: 199.96 LBS | SYSTOLIC BLOOD PRESSURE: 147 MMHG

## 2023-09-04 DIAGNOSIS — K62.5 HEMORRHAGE OF ANUS AND RECTUM: ICD-10-CM

## 2023-09-04 DIAGNOSIS — Z87.19 PERSONAL HISTORY OF OTHER DISEASES OF THE DIGESTIVE SYSTEM: ICD-10-CM

## 2023-09-04 DIAGNOSIS — Z98.890 OTHER SPECIFIED POSTPROCEDURAL STATES: ICD-10-CM

## 2023-09-04 LAB
ALBUMIN SERPL ELPH-MCNC: 3.9 G/DL — SIGNIFICANT CHANGE UP (ref 3.3–5)
ALP SERPL-CCNC: 62 U/L — SIGNIFICANT CHANGE UP (ref 40–120)
ALT FLD-CCNC: 27 U/L — SIGNIFICANT CHANGE UP (ref 12–78)
ANION GAP SERPL CALC-SCNC: 5 MMOL/L — SIGNIFICANT CHANGE UP (ref 5–17)
APPEARANCE UR: ABNORMAL
AST SERPL-CCNC: 26 U/L — SIGNIFICANT CHANGE UP (ref 15–37)
BACTERIA # UR AUTO: ABNORMAL
BASOPHILS # BLD AUTO: 0.05 K/UL — SIGNIFICANT CHANGE UP (ref 0–0.2)
BASOPHILS NFR BLD AUTO: 0.6 % — SIGNIFICANT CHANGE UP (ref 0–2)
BILIRUB SERPL-MCNC: 0.3 MG/DL — SIGNIFICANT CHANGE UP (ref 0.2–1.2)
BILIRUB UR-MCNC: NEGATIVE — SIGNIFICANT CHANGE UP
BUN SERPL-MCNC: 15 MG/DL — SIGNIFICANT CHANGE UP (ref 7–23)
CALCIUM SERPL-MCNC: 9 MG/DL — SIGNIFICANT CHANGE UP (ref 8.5–10.1)
CHLORIDE SERPL-SCNC: 107 MMOL/L — SIGNIFICANT CHANGE UP (ref 96–108)
CO2 SERPL-SCNC: 28 MMOL/L — SIGNIFICANT CHANGE UP (ref 22–31)
COLOR SPEC: YELLOW — SIGNIFICANT CHANGE UP
COMMENT - URINE: SIGNIFICANT CHANGE UP
CREAT SERPL-MCNC: 0.86 MG/DL — SIGNIFICANT CHANGE UP (ref 0.5–1.3)
DIFF PNL FLD: ABNORMAL
EGFR: 93 ML/MIN/1.73M2 — SIGNIFICANT CHANGE UP
EOSINOPHIL # BLD AUTO: 0.15 K/UL — SIGNIFICANT CHANGE UP (ref 0–0.5)
EOSINOPHIL NFR BLD AUTO: 1.7 % — SIGNIFICANT CHANGE UP (ref 0–6)
EPI CELLS # UR: SIGNIFICANT CHANGE UP
GLUCOSE SERPL-MCNC: 93 MG/DL — SIGNIFICANT CHANGE UP (ref 70–99)
GLUCOSE UR QL: NEGATIVE MG/DL — SIGNIFICANT CHANGE UP
HCG SERPL-ACNC: <1 MIU/ML — SIGNIFICANT CHANGE UP
HCT VFR BLD CALC: 43.1 % — SIGNIFICANT CHANGE UP (ref 34.5–45)
HGB BLD-MCNC: 13.9 G/DL — SIGNIFICANT CHANGE UP (ref 11.5–15.5)
IMM GRANULOCYTES NFR BLD AUTO: 0.3 % — SIGNIFICANT CHANGE UP (ref 0–0.9)
KETONES UR-MCNC: NEGATIVE — SIGNIFICANT CHANGE UP
LEUKOCYTE ESTERASE UR-ACNC: ABNORMAL
LIDOCAIN IGE QN: 25 U/L — SIGNIFICANT CHANGE UP (ref 13–75)
LYMPHOCYTES # BLD AUTO: 2.46 K/UL — SIGNIFICANT CHANGE UP (ref 1–3.3)
LYMPHOCYTES # BLD AUTO: 27.9 % — SIGNIFICANT CHANGE UP (ref 13–44)
MCHC RBC-ENTMCNC: 29.8 PG — SIGNIFICANT CHANGE UP (ref 27–34)
MCHC RBC-ENTMCNC: 32.3 G/DL — SIGNIFICANT CHANGE UP (ref 32–36)
MCV RBC AUTO: 92.5 FL — SIGNIFICANT CHANGE UP (ref 80–100)
MONOCYTES # BLD AUTO: 0.76 K/UL — SIGNIFICANT CHANGE UP (ref 0–0.9)
MONOCYTES NFR BLD AUTO: 8.6 % — SIGNIFICANT CHANGE UP (ref 2–14)
NEUTROPHILS # BLD AUTO: 5.36 K/UL — SIGNIFICANT CHANGE UP (ref 1.8–7.4)
NEUTROPHILS NFR BLD AUTO: 60.9 % — SIGNIFICANT CHANGE UP (ref 43–77)
NITRITE UR-MCNC: NEGATIVE — SIGNIFICANT CHANGE UP
NRBC # BLD: 0 /100 WBCS — SIGNIFICANT CHANGE UP (ref 0–0)
PH UR: 7 — SIGNIFICANT CHANGE UP (ref 5–8)
PLATELET # BLD AUTO: 271 K/UL — SIGNIFICANT CHANGE UP (ref 150–400)
POTASSIUM SERPL-MCNC: 4.1 MMOL/L — SIGNIFICANT CHANGE UP (ref 3.5–5.3)
POTASSIUM SERPL-SCNC: 4.1 MMOL/L — SIGNIFICANT CHANGE UP (ref 3.5–5.3)
PROT SERPL-MCNC: 7.8 GM/DL — SIGNIFICANT CHANGE UP (ref 6–8.3)
PROT UR-MCNC: NEGATIVE MG/DL — SIGNIFICANT CHANGE UP
RBC # BLD: 4.66 M/UL — SIGNIFICANT CHANGE UP (ref 3.8–5.2)
RBC # FLD: 12.8 % — SIGNIFICANT CHANGE UP (ref 10.3–14.5)
RBC CASTS # UR COMP ASSIST: SIGNIFICANT CHANGE UP /HPF (ref 0–4)
SODIUM SERPL-SCNC: 140 MMOL/L — SIGNIFICANT CHANGE UP (ref 135–145)
SP GR SPEC: 1.01 — SIGNIFICANT CHANGE UP (ref 1.01–1.02)
UROBILINOGEN FLD QL: NEGATIVE MG/DL — SIGNIFICANT CHANGE UP
WBC # BLD: 8.81 K/UL — SIGNIFICANT CHANGE UP (ref 3.8–10.5)
WBC # FLD AUTO: 8.81 K/UL — SIGNIFICANT CHANGE UP (ref 3.8–10.5)
WBC UR QL: SIGNIFICANT CHANGE UP

## 2023-09-04 PROCEDURE — 74177 CT ABD & PELVIS W/CONTRAST: CPT | Mod: 26,MA

## 2023-09-04 PROCEDURE — 99285 EMERGENCY DEPT VISIT HI MDM: CPT

## 2023-09-04 RX ORDER — SODIUM CHLORIDE 9 MG/ML
1000 INJECTION INTRAMUSCULAR; INTRAVENOUS; SUBCUTANEOUS ONCE
Refills: 0 | Status: COMPLETED | OUTPATIENT
Start: 2023-09-04 | End: 2023-09-04

## 2023-09-04 RX ORDER — HYDROCORTISONE 1 %
1 OINTMENT (GRAM) TOPICAL
Qty: 1 | Refills: 0
Start: 2023-09-04 | End: 2023-09-10

## 2023-09-04 RX ADMIN — SODIUM CHLORIDE 1000 MILLILITER(S): 9 INJECTION INTRAMUSCULAR; INTRAVENOUS; SUBCUTANEOUS at 19:16

## 2023-09-04 NOTE — ED ADULT TRIAGE NOTE - CHIEF COMPLAINT QUOTE
patient c/o of blood in stool and lower back pain , denier dysuria, patient stated " I have a lymph nodes since July and I'm taking medication for it " , denied chest pain no difficulty breathing

## 2023-09-04 NOTE — ED ADULT NURSE NOTE - TEMPLATE LIST FOR HEAD TO TOE ASSESSMENT
Chart reviewed.  Rx sent to pt's preferred pharmacy.       [id number] - [place], [place] - [address]  [facility] - [place], [place] - [address] [address]    Alberto Carlos MD      
Pharmacy requesting Baclofen 10 mg tablet.   Take 1 tablet by mouth twice daily.  Previously prescribed by Gregory Guzman PA-C.  His information has been crossed off of the request and Dr. Carlos has been written in his place.  Last fill date 3/21/2018.  Quantity 60 for a 30 day supply.  
 Symptoms

## 2023-09-04 NOTE — ED PROVIDER NOTE - OBJECTIVE STATEMENT
33-year-old female with no significant past medical history presenting to ED with reported episode of bright red blood per rectum x2 episodes reports brown stools, denies any pain denies any history of abdominal pain history of prior endoscopy with noted gastritis but no peptic ulcers, patient denies any hematemesis denies any nausea vomiting diarrhea.  No other reported complaints.  No anticoagulation use.

## 2023-09-04 NOTE — ED ADULT NURSE NOTE - OBJECTIVE STATEMENT
Pt is A&OX4, ambulatory. Stated she had a sinus infection since july and her lymph nodes have been swollen since then. Finished a course of amoxicillin 2 weeks ago. States having two episodes of bloody stool last week and last night. States it was bright red blood both times. no pmh

## 2023-09-04 NOTE — ED PROVIDER NOTE - NSFOLLOWUPINSTRUCTIONS_ED_ALL_ED_FT
Hemorrhoids    WHAT YOU NEED TO KNOW:    Hemorrhoids are swollen blood vessels inside your rectum (internal hemorrhoids) or on your anus (external hemorrhoids). Sometimes a hemorrhoid may prolapse. This means it extends out of your anus.    DISCHARGE INSTRUCTIONS:    Return to the emergency department if:   •You have severe pain in your rectum or around your anus.      •You have severe pain in your abdomen and you are vomiting.       •You have bleeding from your anus that soaks through your underwear.       Contact your healthcare provider if:   •You have frequent and painful bowel movements.      •Your hemorrhoid looks or feels more swollen than usual.       •You do not have a bowel movement for 2 days or more.       •You see or feel tissue coming through your anus.       •You have questions or concerns about your condition or care.      Medicines: You may need any of the following:   •A pad, cream, or ointment can help decrease pain, swelling, and itching.       •Stool softeners help treat or prevent constipation.       •NSAIDs, such as ibuprofen, help decrease swelling, pain, and fever. NSAIDs can cause stomach bleeding or kidney problems in certain people. If you take blood thinner medicine, always ask your healthcare provider if NSAIDs are safe for you. Always read the medicine label and follow directions.      •Take your medicine as directed. Contact your healthcare provider if you think your medicine is not helping or if you have side effects. Tell him or her if you are allergic to any medicine. Keep a list of the medicines, vitamins, and herbs you take. Include the amounts, and when and why you take them. Bring the list or the pill bottles to follow-up visits. Carry your medicine list with you in case of an emergency.      Manage your symptoms:   •Apply ice on your anus for 15 to 20 minutes every hour or as directed. Use an ice pack, or put crushed ice in a plastic bag. Cover it with a towel before you apply it to your anus. Ice helps prevent tissue damage and decreases swelling and pain.      •Take a sitz bath. Fill a bathtub with 4 to 6 inches of warm water. You may also use a sitz bath pan that fits inside a toilet bowl. Sit in the sitz bath for 15 minutes. Do this 3 times a day, and after each bowel movement. The warm water can help decrease pain and swelling.       •Keep your anal area clean. Gently wash the area with warm water daily. Soap may irritate the area. After a bowel movement, wipe with moist towelettes or wet toilet paper. Dry toilet paper can irritate the area.       Prevent hemorrhoids:   •Do not strain to have a bowel movement. Do not sit on the toilet too long. These actions can increase pressure on the tissues in your rectum and anus.       •Drink plenty of liquids. Liquids can help prevent constipation. Ask how much liquid to drink each day and which liquids are best for you.       •Eat a variety of high-fiber foods. Examples include fruits, vegetables, and whole grains. Ask your healthcare provider how much fiber you need each day. You may need to take a fiber supplement.              •Exercise as directed. Exercise, such as walking, may make it easier to have a bowel movement. Ask your healthcare provider to help you create an exercise plan.       •Do not have anal sex. Anal sex can weaken the skin around your rectum and anus.       •Avoid heavy lifting. This can cause straining and increase your risk for another hemorrhoid.       Follow up with your doctor as directed: Write down your questions so you remember to ask them during your visits.        © Copyright [employer] 2021           back to top                          © Copyright [employer] 2021

## 2023-09-04 NOTE — ED PROVIDER NOTE - PATIENT PORTAL LINK FT
You can access the FollowMyHealth Patient Portal offered by [facility] by registering at the following website: [email or web address]. By joining [facility]’s FollowMyHealth portal, you will also be able to view your health information using other applications (apps) compatible with our system.

## 2023-09-04 NOTE — ED PROVIDER NOTE - CLINICAL SUMMARY MEDICAL DECISION MAKING FREE TEXT BOX
33-year-old female with no significant past medical history presenting to ED with reported episode of bright red blood per rectum x2 episodes reports brown stools, denies any pain denies any history of abdominal pain history of prior endoscopy with noted gastritis but no peptic ulcers, patient denies any hematemesis denies any nausea vomiting diarrhea.  No other reported complaints.  No anticoagulation use.    likely hemorrhoids   f/u GI  patient requested CT  anusol   f/u CT imaging, reassess

## 2023-09-08 ENCOUNTER — RESULT REVIEW (OUTPATIENT)
Age: 33
End: 2023-09-08

## 2023-09-08 ENCOUNTER — APPOINTMENT (OUTPATIENT)
Dept: OTOLARYNGOLOGY | Facility: CLINIC | Age: 33
End: 2023-09-08
Payer: COMMERCIAL

## 2023-09-08 ENCOUNTER — OUTPATIENT (OUTPATIENT)
Dept: OUTPATIENT SERVICES | Facility: HOSPITAL | Age: 33
LOS: 1 days | End: 2023-09-08
Payer: COMMERCIAL

## 2023-09-08 ENCOUNTER — NON-APPOINTMENT (OUTPATIENT)
Age: 33
End: 2023-09-08

## 2023-09-08 VITALS
HEIGHT: 70 IN | BODY MASS INDEX: 28.49 KG/M2 | WEIGHT: 199 LBS | SYSTOLIC BLOOD PRESSURE: 128 MMHG | DIASTOLIC BLOOD PRESSURE: 77 MMHG | HEART RATE: 75 BPM

## 2023-09-08 DIAGNOSIS — J31.0 CHRONIC RHINITIS: ICD-10-CM

## 2023-09-08 DIAGNOSIS — J32.4 CHRONIC PANSINUSITIS: ICD-10-CM

## 2023-09-08 DIAGNOSIS — R44.8 OTHER SYMPTOMS AND SIGNS INVOLVING GENERAL SENSATIONS AND PERCEPTIONS: ICD-10-CM

## 2023-09-08 DIAGNOSIS — E04.9 NONTOXIC GOITER, UNSPECIFIED: ICD-10-CM

## 2023-09-08 DIAGNOSIS — R51.9 HEADACHE, UNSPECIFIED: ICD-10-CM

## 2023-09-08 DIAGNOSIS — J34.2 DEVIATED NASAL SEPTUM: ICD-10-CM

## 2023-09-08 PROBLEM — Z78.9 OTHER SPECIFIED HEALTH STATUS: Chronic | Status: ACTIVE | Noted: 2023-09-04

## 2023-09-08 PROCEDURE — 76536 US EXAM OF HEAD AND NECK: CPT

## 2023-09-08 PROCEDURE — 31231 NASAL ENDOSCOPY DX: CPT

## 2023-09-08 PROCEDURE — 99204 OFFICE O/P NEW MOD 45 MIN: CPT | Mod: 25

## 2023-09-08 PROCEDURE — 76536 US EXAM OF HEAD AND NECK: CPT | Mod: 26

## 2023-09-08 PROCEDURE — 70486 CT MAXILLOFACIAL W/O DYE: CPT

## 2023-09-08 RX ORDER — AZELASTINE HYDROCHLORIDE 137 UG/1
137 SPRAY, METERED NASAL TWICE DAILY
Qty: 1 | Refills: 5 | Status: ACTIVE | COMMUNITY
Start: 2023-09-08 | End: 1900-01-01

## 2023-09-08 NOTE — PROCEDURE
[Recalcitrant Symptoms] : recalcitrant symptoms  [None] : none [FreeTextEntry6] : nasal endoscopy left  middle meatus normal clear to NP right middle meatus normal slightly enlarged middle turbinate deviated septum

## 2023-09-08 NOTE — CONSULT LETTER
[Dear  ___] : Dear  [unfilled], [Consult Letter:] : I had the pleasure of evaluating your patient, [unfilled]. [Please see my note below.] : Please see my note below. [Consult Closing:] : Thank you very much for allowing me to participate in the care of this patient.  If you have any questions, please do not hesitate to contact me. [Sincerely,] : Sincerely, [FreeTextEntry3] : Todd Sadler MD FACS
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
## 2023-09-08 NOTE — PHYSICAL EXAM
[FreeTextEntry1] : mild maxillary tenderness  [de-identified] : fullness over thyroid gland  [de-identified] : mildly deviated septum left Mildly Inflamed Turbinates   [Midline] : trachea located in midline position [de-identified] : cryptic tonsils, bony exostosis FOM, Tonsils Type 2   [Normal] : orientation to person, place, and time: normal
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
## 2023-09-08 NOTE — ADDENDUM
[FreeTextEntry1] : Documented by Mela Denson acting as a scribe for Dr. Sadler on [mm//dd/yyyy].   All Medical record entries made by the scribe were at my, Dr. Sadler, direction and personally directed by me on 09/08/2023. I have reviewed the chart and agree that the record accurately reflects my personal performance of the history, physical exam, assessment, and plan. I have also personally directed, reviewed, and agreed with the discharge instructions.

## 2023-09-08 NOTE — REVIEW OF SYSTEMS
[Sneezing] : sneezing [Ear Pain] : ear pain [Nasal Congestion] : nasal congestion [Sinus Pressure] : sinus pressure [Throat Clearing] : throat clearing [Eye Pain] : eye pain [Heartburn] : heartburn [Easy Bruising] : a tendency for easy bruising [Swollen Glands] : swollen glands [Negative] : Endocrine [de-identified] : Headache  [FreeTextEntry1] : Fatigue

## 2023-09-09 DIAGNOSIS — E04.9 NONTOXIC GOITER, UNSPECIFIED: ICD-10-CM

## 2023-09-11 LAB
T3FREE SERPL-MCNC: 2.8 PG/ML
T4 FREE SERPL-MCNC: 1.2 NG/DL
TSH SERPL-ACNC: 1.99 UIU/ML

## 2023-09-12 LAB
THYROGLOB AB SERPL-ACNC: 23.3 IU/ML
THYROPEROXIDASE AB SERPL IA-ACNC: 144 IU/ML

## 2023-09-13 ENCOUNTER — APPOINTMENT (OUTPATIENT)
Dept: INTERNAL MEDICINE | Facility: CLINIC | Age: 33
End: 2023-09-13
Payer: COMMERCIAL

## 2023-09-13 VITALS
BODY MASS INDEX: 29.06 KG/M2 | HEART RATE: 80 BPM | HEIGHT: 70 IN | TEMPERATURE: 98.1 F | WEIGHT: 203 LBS | OXYGEN SATURATION: 99 %

## 2023-09-13 VITALS — SYSTOLIC BLOOD PRESSURE: 122 MMHG | DIASTOLIC BLOOD PRESSURE: 72 MMHG

## 2023-09-13 DIAGNOSIS — E06.3 AUTOIMMUNE THYROIDITIS: ICD-10-CM

## 2023-09-13 DIAGNOSIS — F98.8 OTHER SPECIFIED BEHAVIORAL AND EMOTIONAL DISORDERS WITH ONSET USUALLY OCCURRING IN CHILDHOOD AND ADOLESCENCE: ICD-10-CM

## 2023-09-13 PROCEDURE — 99213 OFFICE O/P EST LOW 20 MIN: CPT

## 2023-10-11 ENCOUNTER — APPOINTMENT (OUTPATIENT)
Dept: ORTHOPEDIC SURGERY | Facility: CLINIC | Age: 33
End: 2023-10-11

## 2023-10-27 ENCOUNTER — APPOINTMENT (OUTPATIENT)
Dept: INTERNAL MEDICINE | Facility: CLINIC | Age: 33
End: 2023-10-27

## 2023-12-22 ENCOUNTER — NON-APPOINTMENT (OUTPATIENT)
Age: 33
End: 2023-12-22

## 2024-01-04 ENCOUNTER — APPOINTMENT (OUTPATIENT)
Dept: DERMATOLOGY | Facility: CLINIC | Age: 34
End: 2024-01-04
Payer: COMMERCIAL

## 2024-01-04 DIAGNOSIS — Z12.83 ENCOUNTER FOR SCREENING FOR MALIGNANT NEOPLASM OF SKIN: ICD-10-CM

## 2024-01-04 DIAGNOSIS — D22.9 MELANOCYTIC NEVI, UNSPECIFIED: ICD-10-CM

## 2024-01-04 DIAGNOSIS — K13.0 DISEASES OF LIPS: ICD-10-CM

## 2024-01-04 PROCEDURE — 99213 OFFICE O/P EST LOW 20 MIN: CPT

## 2024-01-04 RX ORDER — HYDROCORTISONE 25 MG/G
2.5 CREAM TOPICAL
Qty: 30 | Refills: 2 | Status: ACTIVE | COMMUNITY
Start: 2024-01-04 | End: 1900-01-01

## 2024-01-04 RX ORDER — KETOCONAZOLE 20 MG/G
2 CREAM TOPICAL
Qty: 1 | Refills: 2 | Status: ACTIVE | COMMUNITY
Start: 2024-01-04 | End: 1900-01-01

## 2024-01-04 NOTE — ASSESSMENT
[FreeTextEntry1] : 1) Angular cheilitis - Reviewed risks (as well as mitigation strategies for adverse drug events as applicable), benefits, and alternatives of therapy  - Start hydrocortisone 2.5% cream mixed with ketoconazole BID to AA for up to 2 weeks and then stop. may use ketoconazole cream for maintenance   2) Nevi - Counseled about clinically benign lesions - Discussed regular OTC sunscreen use, SPF 30 or higher  3) Skin cancer Screening - No lesions clinically concerning for malignancy today - Discussed regular self skin checks and ABCDEs of skin cancer screening - Discussed regular sunscreen use - Pt instructed to report any new or changing lesions  RTC 1 yr for FBSE or sooner if any concerns

## 2024-01-04 NOTE — HISTORY OF PRESENT ILLNESS
[FreeTextEntry1] : f/u rash, moles [de-identified] : 34 y/o F presenting for f/u for above. Has a recurrent rash around the lips. Also requests mole check and FBSE.  No personal or family hx of skin cancer. Mother with precancer, treated with cryo.  Social Hx: radiology tech

## 2024-01-04 NOTE — PHYSICAL EXAM
[Full Body Skin Exam Performed] : performed [FreeTextEntry3] : erythema on lateral commisures of the mouth Fairly uniform and regular brown macules and papules on the trunk and extremities

## 2024-01-15 ENCOUNTER — NON-APPOINTMENT (OUTPATIENT)
Age: 34
End: 2024-01-15

## 2024-01-19 ENCOUNTER — NON-APPOINTMENT (OUTPATIENT)
Age: 34
End: 2024-01-19

## 2024-01-22 ENCOUNTER — TRANSCRIPTION ENCOUNTER (OUTPATIENT)
Age: 34
End: 2024-01-22

## 2024-01-29 ENCOUNTER — APPOINTMENT (OUTPATIENT)
Dept: ORTHOPEDIC SURGERY | Facility: CLINIC | Age: 34
End: 2024-01-29

## 2024-01-31 ENCOUNTER — APPOINTMENT (OUTPATIENT)
Dept: ORTHOPEDIC SURGERY | Facility: CLINIC | Age: 34
End: 2024-01-31

## 2024-02-01 ENCOUNTER — APPOINTMENT (OUTPATIENT)
Dept: NEUROLOGY | Facility: CLINIC | Age: 34
End: 2024-02-01
Payer: COMMERCIAL

## 2024-02-01 VITALS
SYSTOLIC BLOOD PRESSURE: 125 MMHG | HEIGHT: 70 IN | WEIGHT: 198 LBS | DIASTOLIC BLOOD PRESSURE: 79 MMHG | HEART RATE: 99 BPM | BODY MASS INDEX: 28.35 KG/M2

## 2024-02-01 DIAGNOSIS — G43.019 MIGRAINE W/OUT AURA, INTRACTABLE, W/OUT STATUS MIGRAINOSUS: ICD-10-CM

## 2024-02-01 PROCEDURE — 99204 OFFICE O/P NEW MOD 45 MIN: CPT

## 2024-02-01 RX ORDER — BENZONATATE 200 MG/1
200 CAPSULE ORAL
Qty: 21 | Refills: 0 | Status: ACTIVE | COMMUNITY
Start: 2024-01-16

## 2024-02-01 RX ORDER — ALBUTEROL SULFATE 90 UG/1
108 (90 BASE) INHALANT RESPIRATORY (INHALATION)
Qty: 8 | Refills: 0 | Status: ACTIVE | COMMUNITY
Start: 2024-01-16

## 2024-02-01 RX ORDER — RIZATRIPTAN BENZOATE 10 MG/1
10 TABLET ORAL
Qty: 9 | Refills: 1 | Status: ACTIVE | COMMUNITY
Start: 2024-02-01 | End: 1900-01-01

## 2024-02-01 RX ORDER — BUTALBITAL, ACETAMINOPHEN AND CAFFEINE 325; 50; 40 MG/1; MG/1; MG/1
50-325-40 TABLET ORAL
Qty: 30 | Refills: 0 | Status: ACTIVE | COMMUNITY
Start: 2024-01-20

## 2024-02-01 RX ORDER — HYDROCORTISONE ACETATE 25 MG/1
25 SUPPOSITORY RECTAL
Qty: 24 | Refills: 0 | Status: ACTIVE | COMMUNITY
Start: 2023-09-04

## 2024-02-01 NOTE — HISTORY OF PRESENT ILLNESS
[FreeTextEntry1] : This is a case of a 33 yr right handed female with PMH of Gerd, borderline Hashimoto's, ADD presents today with headaches.  Pt has had a history of headaches since late 20's.  Denies symptoms.  Pt treated with Aleve with relief.  Pt reports in the summer, pt developed a headache. Headaches were daily for 2 months then resolved. Pt went to PCP treated for possible sinus infection.   Headache returned 2 weeks ago.  Pt reports being on a night shift and did not sleep well and headache started. Pt reports slowing improving but still daily.  Pt reports headaches are intermittent.  Headaches can last 20 to all day. Pain 6/10.   Located at the top of the head/left orbital.  Described as throbbing.  Denies n/v.  Denies loss of vision, doubled, or blurred.  Pt does have photophobia denies phonophobia.  Denies extremity weakness or slurred speech.  Denies numbness or tingling.  Denies fevers or sickness.  Denies auras.  Went to  was prescribed firocet but did not take it.  Treating headaches with nothing right now.  At times will treat with Excedrin.  Today  Triggers: sinus? was on a night shift   Past medication: Current medication:  Occupation: CT tech  Caffeine: 1 cup in the am  Water: 1-2 a day  Exercise: swim and run  sleep: 7- 8 hr   Location: top of the head/left orbital.  Description: throbbing  Associated symptoms: photophobia Triggers: sinus? was on a night shift Comorbidities: None Imaging: n/a Interventions: n/a Family history: n/a Allergies: NKA

## 2024-02-01 NOTE — ASSESSMENT
[FreeTextEntry1] : This is a case of a 33 yr right handed female with PMH of Gerd, borderline Hashimoto's, ADD presents today with headaches. Plan:    {    } Nabumetone 500 mg    {    } rizatriptan for abortive   {    } start mag 400 mg and b2 400 mg    {    } f/u 2/3 months or sooner if needed  Headache education provided: [] Stay well hydrated [] Limit excessive caffeine and alcohol intake [] Maintain good sleep hygiene [] Try to avoid triggers [] Practice good eating habits [] Avoid excessive use of over the counter pain medications, as they can cause medication overuse headaches  [] Keep a headache diary [] Relaxation techniques, biofeedback, massage therapy, acupunctures, and heating pads may be effective  The above plan was discussed with Milvia Vallejo in great detail. Milvia Vallejo verbalized understanding and agrees with plan as detailed above. Patient was provided education and counselling on current diagnosis/symptoms. She was advised to call our clinic at [phone] for any new or worsening symptoms, or with any questions or concerns. In case of acute onset of neurological symptoms or worsening presentation, patient was advised to present to nearest emergency room for further evaluation. Milvia Vallejo expressed understanding and all her questions/concerns were addressed.

## 2024-02-05 ENCOUNTER — TRANSCRIPTION ENCOUNTER (OUTPATIENT)
Age: 34
End: 2024-02-05

## 2024-02-09 ENCOUNTER — TRANSCRIPTION ENCOUNTER (OUTPATIENT)
Age: 34
End: 2024-02-09

## 2024-02-12 ENCOUNTER — TRANSCRIPTION ENCOUNTER (OUTPATIENT)
Age: 34
End: 2024-02-12

## 2024-02-15 ENCOUNTER — APPOINTMENT (OUTPATIENT)
Dept: ORTHOPEDIC SURGERY | Facility: CLINIC | Age: 34
End: 2024-02-15
Payer: COMMERCIAL

## 2024-02-15 DIAGNOSIS — M77.8 OTHER ENTHESOPATHIES, NOT ELSEWHERE CLASSIFIED: ICD-10-CM

## 2024-02-15 PROCEDURE — 99203 OFFICE O/P NEW LOW 30 MIN: CPT

## 2024-02-15 PROCEDURE — 73030 X-RAY EXAM OF SHOULDER: CPT | Mod: LT

## 2024-02-15 PROCEDURE — 73010 X-RAY EXAM OF SHOULDER BLADE: CPT | Mod: LT

## 2024-02-15 PROCEDURE — 72040 X-RAY EXAM NECK SPINE 2-3 VW: CPT

## 2024-02-15 NOTE — PHYSICAL EXAM
[5___] : internal rotation 5[unfilled]/5 [Left] : left shoulder [There are no fractures, subluxations or dislocations. No significant abnormalities are seen] : There are no fractures, subluxations or dislocations. No significant abnormalities are seen [Sitting] : sitting [Mild] : mild [] : positive impingement testing [TWNoteComboBox7] : active forward flexion 170 degrees [TWNoteComboBox6] : internal rotation L1 [de-identified] : external rotation 80 degrees

## 2024-02-15 NOTE — HISTORY OF PRESENT ILLNESS
[Neck] : neck [de-identified] : 33 year old RHD female with pain in the left shoulder, symptoms started about a month ago, no specific injury, she is an avid swimmer. She has not stopped swimming bc of the symptoms. She has pain that radiates un into the left trap and a couple of episodes of numbness tingling left arm, short lived. She has been taking Diclofenac as needed for the pain. Pain with lifting, sleeping, butterfly, free style all increase symptoms. 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 No recent treatment.  [] : no [FreeTextEntry1] : left shoulder  [FreeTextEntry5] : reports she swims for 90 minutes 3-4x week. c/o left shoulder pain for 1 1/2 months, occasional N/T into left fingers.   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

## 2024-02-16 ENCOUNTER — TRANSCRIPTION ENCOUNTER (OUTPATIENT)
Age: 34
End: 2024-02-16

## 2024-02-16 RX ORDER — NABUMETONE 500 MG/1
500 TABLET, FILM COATED ORAL
Qty: 14 | Refills: 0 | Status: ACTIVE | COMMUNITY
Start: 2024-02-01 | End: 1900-01-01

## 2024-02-22 ENCOUNTER — APPOINTMENT (OUTPATIENT)
Dept: MRI IMAGING | Facility: CLINIC | Age: 34
End: 2024-02-22
Payer: COMMERCIAL

## 2024-02-22 ENCOUNTER — TRANSCRIPTION ENCOUNTER (OUTPATIENT)
Age: 34
End: 2024-02-22

## 2024-02-22 PROCEDURE — 73221 MRI JOINT UPR EXTREM W/O DYE: CPT | Mod: LT

## 2024-02-26 ENCOUNTER — TRANSCRIPTION ENCOUNTER (OUTPATIENT)
Age: 34
End: 2024-02-26

## 2024-02-27 ENCOUNTER — APPOINTMENT (OUTPATIENT)
Dept: ORTHOPEDIC SURGERY | Facility: CLINIC | Age: 34
End: 2024-02-27
Payer: COMMERCIAL

## 2024-02-27 VITALS — WEIGHT: 198 LBS | BODY MASS INDEX: 28.35 KG/M2 | HEIGHT: 70 IN

## 2024-02-27 DIAGNOSIS — M75.02 ADHESIVE CAPSULITIS OF LEFT SHOULDER: ICD-10-CM

## 2024-02-27 PROCEDURE — 99214 OFFICE O/P EST MOD 30 MIN: CPT

## 2024-02-27 NOTE — PHYSICAL EXAM
[Sitting] : sitting [Mild] : mild [5___] : internal rotation 5[unfilled]/5 [Left] : left shoulder [There are no fractures, subluxations or dislocations. No significant abnormalities are seen] : There are no fractures, subluxations or dislocations. No significant abnormalities are seen [] : no scapular winging [TWNoteComboBox7] : active forward flexion 170 degrees [de-identified] : external rotation 80 degrees [TWNoteComboBox6] : internal rotation L1

## 2024-02-27 NOTE — HISTORY OF PRESENT ILLNESS
[Neck] : neck [4] : 4 [3] : 3 [Dull/Aching] : dull/aching [de-identified] : 2/27/24: Here to f/up left shoulder and review MRI results.  33 year old RHD female with pain in the left shoulder, symptoms started about a month ago, no specific injury, she is an avid swimmer. She has not stopped swimming bc of the symptoms. She has pain that radiates un into the left trap and a couple of episodes of numbness tingling left arm, short lived. She has been taking Diclofenac as needed for the pain. Pain with lifting, sleeping, butterfly, free style all increase symptoms. 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 No recent treatment.  [] : no [FreeTextEntry1] : left shoulder  [FreeTextEntry5] : MRI results today.

## 2024-02-27 NOTE — DATA REVIEWED
[Left] : left [MRI] : MRI [Shoulder] : shoulder [Report was reviewed and noted in the chart] : The report was reviewed and noted in the chart [I independently reviewed and interpreted images and report] : I independently reviewed and interpreted images and report [I reviewed the films/CD] : I reviewed the films/CD [FreeTextEntry1] : thickening inferior capsule - adhesive capsulitis.

## 2024-02-27 NOTE — DISCUSSION/SUMMARY
[de-identified] : 33f with left shoulder adhesive capsulitis. MRI without rotator cuff or labral tear.  1) start physical therapy 2) cryotherapy, rest and activity modification 3) nsaids prn - gi precautions reviewed 4) rtc 6-8 weeks  Entered by Daja De La Torre acting as scribe. Dr. Garcia- The documentation recorded by the scribe accurately reflects the service I personally performed and the decisions made by me.

## 2024-03-19 ENCOUNTER — OUTPATIENT (OUTPATIENT)
Dept: OUTPATIENT SERVICES | Facility: HOSPITAL | Age: 34
LOS: 1 days | End: 2024-03-19
Payer: COMMERCIAL

## 2024-03-19 ENCOUNTER — APPOINTMENT (OUTPATIENT)
Dept: CT IMAGING | Facility: CLINIC | Age: 34
End: 2024-03-19
Payer: COMMERCIAL

## 2024-03-19 DIAGNOSIS — K57.92 DIVERTICULITIS OF INTESTINE, PART UNSPECIFIED, WITHOUT PERFORATION OR ABSCESS WITHOUT BLEEDING: ICD-10-CM

## 2024-03-19 PROCEDURE — 74177 CT ABD & PELVIS W/CONTRAST: CPT

## 2024-03-19 PROCEDURE — 74177 CT ABD & PELVIS W/CONTRAST: CPT | Mod: 26

## 2024-03-21 LAB
M TB IFN-G BLD-IMP: POSITIVE
QUANTIFERON TB PLUS MITOGEN MINUS NIL: >10 IU/ML
QUANTIFERON TB PLUS NIL: 0.03 IU/ML
QUANTIFERON TB PLUS TB1 MINUS NIL: 0.68 IU/ML
QUANTIFERON TB PLUS TB2 MINUS NIL: 0.78 IU/ML

## 2024-03-22 ENCOUNTER — OUTPATIENT (OUTPATIENT)
Dept: OUTPATIENT SERVICES | Facility: HOSPITAL | Age: 34
LOS: 1 days | End: 2024-03-22
Payer: COMMERCIAL

## 2024-03-22 DIAGNOSIS — R76.12 NONSPECIFIC REACTION TO CELL MEDIATED IMMUNITY MEASUREMENT OF GAMMA INTERFERON ANTIGEN RESPONSE WITHOUT ACTIVE TUBERCULOSIS: ICD-10-CM

## 2024-03-22 PROCEDURE — 71046 X-RAY EXAM CHEST 2 VIEWS: CPT

## 2024-03-22 PROCEDURE — 71046 X-RAY EXAM CHEST 2 VIEWS: CPT | Mod: 26

## 2024-03-23 DIAGNOSIS — R76.12 NONSPECIFIC REACTION TO CELL MEDIATED IMMUNITY MEASUREMENT OF GAMMA INTERFERON ANTIGEN RESPONSE WITHOUT ACTIVE TUBERCULOSIS: ICD-10-CM

## 2024-04-23 ENCOUNTER — RESULT REVIEW (OUTPATIENT)
Age: 34
End: 2024-04-23

## 2024-04-30 ENCOUNTER — APPOINTMENT (OUTPATIENT)
Dept: ORTHOPEDIC SURGERY | Facility: CLINIC | Age: 34
End: 2024-04-30

## 2024-05-07 ENCOUNTER — APPOINTMENT (OUTPATIENT)
Dept: NEUROLOGY | Facility: CLINIC | Age: 34
End: 2024-05-07

## 2024-05-08 ENCOUNTER — OUTPATIENT (OUTPATIENT)
Dept: OUTPATIENT SERVICES | Facility: HOSPITAL | Age: 34
LOS: 1 days | End: 2024-05-08
Payer: COMMERCIAL

## 2024-05-08 ENCOUNTER — APPOINTMENT (OUTPATIENT)
Dept: ULTRASOUND IMAGING | Facility: CLINIC | Age: 34
End: 2024-05-08
Payer: COMMERCIAL

## 2024-05-08 DIAGNOSIS — Z12.31 ENCOUNTER FOR SCREENING MAMMOGRAM FOR MALIGNANT NEOPLASM OF BREAST: ICD-10-CM

## 2024-05-08 DIAGNOSIS — Z00.8 ENCOUNTER FOR OTHER GENERAL EXAMINATION: ICD-10-CM

## 2024-05-08 PROCEDURE — 76642 ULTRASOUND BREAST LIMITED: CPT

## 2024-05-08 PROCEDURE — 76642 ULTRASOUND BREAST LIMITED: CPT | Mod: 26,LT

## 2024-09-20 ENCOUNTER — NON-APPOINTMENT (OUTPATIENT)
Age: 34
End: 2024-09-20

## 2024-10-03 ENCOUNTER — TRANSCRIPTION ENCOUNTER (OUTPATIENT)
Age: 34
End: 2024-10-03

## 2024-11-07 ENCOUNTER — APPOINTMENT (OUTPATIENT)
Dept: INTERNAL MEDICINE | Facility: CLINIC | Age: 34
End: 2024-11-07

## 2024-11-08 ENCOUNTER — NON-APPOINTMENT (OUTPATIENT)
Age: 34
End: 2024-11-08